# Patient Record
Sex: FEMALE | Race: WHITE | URBAN - METROPOLITAN AREA
[De-identification: names, ages, dates, MRNs, and addresses within clinical notes are randomized per-mention and may not be internally consistent; named-entity substitution may affect disease eponyms.]

---

## 2019-01-02 ENCOUNTER — OFFICE VISIT (OUTPATIENT)
Dept: FAMILY MEDICINE CLINIC | Age: 39
End: 2019-01-02
Payer: COMMERCIAL

## 2019-01-02 VITALS
TEMPERATURE: 98.9 F | OXYGEN SATURATION: 98 % | HEIGHT: 65 IN | WEIGHT: 229 LBS | DIASTOLIC BLOOD PRESSURE: 84 MMHG | RESPIRATION RATE: 16 BRPM | BODY MASS INDEX: 38.15 KG/M2 | HEART RATE: 88 BPM | SYSTOLIC BLOOD PRESSURE: 112 MMHG

## 2019-01-02 DIAGNOSIS — H10.31 ACUTE CONJUNCTIVITIS OF RIGHT EYE, UNSPECIFIED ACUTE CONJUNCTIVITIS TYPE: Primary | ICD-10-CM

## 2019-01-02 PROCEDURE — G8484 FLU IMMUNIZE NO ADMIN: HCPCS | Performed by: PHYSICIAN ASSISTANT

## 2019-01-02 PROCEDURE — G8427 DOCREV CUR MEDS BY ELIG CLIN: HCPCS | Performed by: PHYSICIAN ASSISTANT

## 2019-01-02 PROCEDURE — 4004F PT TOBACCO SCREEN RCVD TLK: CPT | Performed by: PHYSICIAN ASSISTANT

## 2019-01-02 PROCEDURE — 99213 OFFICE O/P EST LOW 20 MIN: CPT | Performed by: PHYSICIAN ASSISTANT

## 2019-01-02 PROCEDURE — G8417 CALC BMI ABV UP PARAM F/U: HCPCS | Performed by: PHYSICIAN ASSISTANT

## 2019-01-02 RX ORDER — TOBRAMYCIN 3 MG/ML
SOLUTION/ DROPS OPHTHALMIC
Qty: 5 ML | Refills: 0 | Status: SHIPPED
Start: 2019-01-02 | End: 2021-02-23

## 2021-02-23 ENCOUNTER — OFFICE VISIT (OUTPATIENT)
Dept: FAMILY MEDICINE CLINIC | Age: 41
End: 2021-02-23
Payer: COMMERCIAL

## 2021-02-23 VITALS
OXYGEN SATURATION: 97 % | HEART RATE: 88 BPM | RESPIRATION RATE: 16 BRPM | BODY MASS INDEX: 37.15 KG/M2 | TEMPERATURE: 99.2 F | WEIGHT: 223 LBS | HEIGHT: 65 IN

## 2021-02-23 DIAGNOSIS — S05.02XA ABRASION OF LEFT CORNEA, INITIAL ENCOUNTER: Primary | ICD-10-CM

## 2021-02-23 PROCEDURE — G8484 FLU IMMUNIZE NO ADMIN: HCPCS | Performed by: PHYSICIAN ASSISTANT

## 2021-02-23 PROCEDURE — 99213 OFFICE O/P EST LOW 20 MIN: CPT | Performed by: PHYSICIAN ASSISTANT

## 2021-02-23 PROCEDURE — 4004F PT TOBACCO SCREEN RCVD TLK: CPT | Performed by: PHYSICIAN ASSISTANT

## 2021-02-23 PROCEDURE — G8417 CALC BMI ABV UP PARAM F/U: HCPCS | Performed by: PHYSICIAN ASSISTANT

## 2021-02-23 PROCEDURE — G8427 DOCREV CUR MEDS BY ELIG CLIN: HCPCS | Performed by: PHYSICIAN ASSISTANT

## 2021-02-23 RX ORDER — IBUPROFEN 800 MG/1
800 TABLET ORAL EVERY 8 HOURS PRN
Qty: 20 TABLET | Refills: 0 | Status: SHIPPED | OUTPATIENT
Start: 2021-02-23

## 2021-02-23 RX ORDER — CIPROFLOXACIN HYDROCHLORIDE 3.5 MG/ML
SOLUTION/ DROPS TOPICAL
Qty: 5 ML | Refills: 0 | Status: SHIPPED | OUTPATIENT
Start: 2021-02-23

## 2021-02-23 NOTE — PROGRESS NOTES
Chief Complaint:   Eye Problem (left eye x 4 days)      History of Present Illness   Source of history provided by:  patient. Carmen Perkins is a [address] y.o. old female presenting to the walk in clinic with redness, irritation, and tearing to the left eye for the past 4 days. Denies any known foreign body exposure. Patient reports associated swelling, tearing, foreign body sensation, and redness. Has not had a recent URI within the past week. Denies any visual changes, visual loss, HA, ear pain, fever, chills, N/V, neck pain, bleeding, rash, or lethargy. Patient has been using Visine drops at home with minimal symptomatic relief. Denies recent contact lens use. Mechanism:     []  FB Exposure     []  Chemical Exposure     []  Direct Trauma     []  High Speed Machinery Use     []  Welding      Circumstances:    []  Contact Lens Use     []  Recent URI Sx's     [x]  Spontaneous Onset     []  Close Contact w/similar Sx's     []  Work Related     History of:     []   Glaucoma     []   Recent Eye Surgery     ROS    Unless otherwise stated in this report or unable to obtain because of the patient's clinical or mental status as evidenced by the medical record, this patients's positive and negative responses for Review of Systems, constitutional, psych, eyes, ENT, cardiovascular, respiratory, gastrointestinal, neurological, genitourinary, musculoskeletal, integument systems and systems related to the presenting problem are either stated in the preceding or were not pertinent or were negative for the symptoms and/or complaints related to the medical problem. Past Surgical History:  has no past surgical history on file. Social History:  reports that she has been smoking. She has never used smokeless tobacco. She reports that she does not drink alcohol or use drugs. Family History: family history is not on file. Allergies: Patient has no known allergies.     Physical Exam VS:  Pulse 88   Temp 99.2 °F (37.3 °C) (Oral)   Resp 16   Ht 5' 5\" (1.651 m)   Wt 223 lb (101.2 kg)   LMP 02/01/2021   SpO2 97%   BMI 37.11 kg/m²    Oxygen Saturation Interpretation: Normal.    Constitutional:  Alert, development consistent with age. Head:  NC/NT. Neck:  Normal ROM. Supple. No adenopathy. Airway patent. Eyes:         Pupils: PERRL bilaterally. Eyelids: No abrasions noted to the upper or lower lids. Moderate edema to the left upper lid noted. Lid eversion performed without any lesions or ulcerations noted. Conjunctiva: Moderate erythema noted to the left conjunctiva. Sclera: Scleral injection on the left. Fluorescein staining negative for any FB, rust ring, or hyphema. There is a small horizontal superficial abrasion noted over the inferior aspect of the cornea. Cornea: No obvious corneal abrasion or ulceration bilaterally. EOM:  Intact bilaterally. Visual Acuity:  Grossly intact. Skin:  No rashes, erythema present, unless noted elsewhere. Chest:  Heart RRR, S1S2, no M/R/G. Lungs CTAB. Lymphatics: No lymphangitis or adenopathy noted. Neurological:  Oriented. Motor functions intact. Lab / Imaging Results   (All laboratory and radiology results have been personally reviewed by myself)    Imaging: All Radiology results interpreted by Radiologist unless otherwise noted. No orders to display         Assessment / Plan     Impression(s):  [facility] was seen today for eye problem. Diagnoses and all orders for this visit:    Abrasion of left cornea, initial encounter    Other orders  -     ibuprofen (ADVIL;MOTRIN) 800 MG tablet; Take 1 tablet by mouth every 8 hours as needed for Pain  -     ciprofloxacin (CILOXAN) 0.3 % ophthalmic solution; 2 drops L eye q 4 hrs WA x 7 days      Disposition:  Disposition: Discharge to home. Fluorescein staining reveals a superficial abrasion to the left cornea. Script written for Cipro ophthalmic drops, side effects and application directions discussed. Prescription also written for 800 mg ibuprofen tablets for symptomatic relief, side effects discussed. F/u with ophthalmology in 48 hrs if not improved (or return to our office for same day referral). ED sooner if symptoms worsen or change. ED immediately with the development of fever, visual changes, ocular pain/swelling, body aches, or shaking chills. Pt is in agreement with this care plan. All questions answered. Zora Chavez PA-C